# Patient Record
Sex: MALE | Race: OTHER | HISPANIC OR LATINO
[De-identification: names, ages, dates, MRNs, and addresses within clinical notes are randomized per-mention and may not be internally consistent; named-entity substitution may affect disease eponyms.]

---

## 2018-03-02 PROBLEM — Z00.00 ENCOUNTER FOR PREVENTIVE HEALTH EXAMINATION: Status: ACTIVE | Noted: 2018-03-02

## 2018-03-09 ENCOUNTER — APPOINTMENT (OUTPATIENT)
Dept: INTERNAL MEDICINE | Facility: CLINIC | Age: 38
End: 2018-03-09

## 2023-07-23 ENCOUNTER — EMERGENCY (EMERGENCY)
Facility: HOSPITAL | Age: 43
LOS: 0 days | Discharge: HOME | End: 2023-07-23
Attending: EMERGENCY MEDICINE
Payer: COMMERCIAL

## 2023-07-23 VITALS
WEIGHT: 214.95 LBS | OXYGEN SATURATION: 96 % | SYSTOLIC BLOOD PRESSURE: 159 MMHG | HEART RATE: 94 BPM | HEIGHT: 67 IN | DIASTOLIC BLOOD PRESSURE: 89 MMHG

## 2023-07-23 DIAGNOSIS — F17.200 NICOTINE DEPENDENCE, UNSPECIFIED, UNCOMPLICATED: ICD-10-CM
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
## 2023-07-23 DIAGNOSIS — R06.7 SNEEZING: ICD-10-CM

## 2023-07-23 DIAGNOSIS — R07.89 OTHER CHEST PAIN: ICD-10-CM

## 2023-07-23 DIAGNOSIS — R05.9 COUGH, UNSPECIFIED: ICD-10-CM
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
## 2023-07-23 PROCEDURE — 99284 EMERGENCY DEPT VISIT MOD MDM: CPT

## 2023-07-23 PROCEDURE — 93010 ELECTROCARDIOGRAM REPORT: CPT

## 2023-07-23 PROCEDURE — 96372 THER/PROPH/DIAG INJ SC/IM: CPT

## 2023-07-23 PROCEDURE — 71046 X-RAY EXAM CHEST 2 VIEWS: CPT

## 2023-07-23 PROCEDURE — 99283 EMERGENCY DEPT VISIT LOW MDM: CPT | Mod: 25

## 2023-07-23 PROCEDURE — 93005 ELECTROCARDIOGRAM TRACING: CPT

## 2023-07-23 PROCEDURE — 71046 X-RAY EXAM CHEST 2 VIEWS: CPT | Mod: 26

## 2023-07-23 RX ORDER — KETOROLAC TROMETHAMINE 30 MG/ML
30 SYRINGE (ML) INJECTION ONCE
Refills: 0 | Status: DISCONTINUED | OUTPATIENT
Start: 2023-07-23 | End: 2023-07-23

## 2023-07-23 RX ADMIN — Medication 30 MILLIGRAM(S): at 21:33

## 2023-07-23 NOTE — ED PROVIDER NOTE - PATIENT PORTAL LINK FT
You can access the FollowMyHealth Patient Portal offered by [facility] by registering at the following website: [email or web address]. By joining [employer]’s FollowMyHealth portal, you will also be able to view your health information using other applications (apps) compatible with our system.

## 2023-07-23 NOTE — ED PROVIDER NOTE - PHYSICAL EXAMINATION
Physical Exam    Vital Signs: I have reviewed the initial vital signs.  Constitutional: appears stated age, no acute distress  Eyes: Sclera clear, EOMI.  Cardiovascular: S1 and S2, regular rate, regular rhythm, well-perfused extremities, radial pulses equal and 2+  Respiratory: unlabored respiratory effort, clear to auscultation bilaterally no wheezing, rales, or rhonchi  Gastrointestinal:  abdomen soft, non-tender, no pulsatile mass, normal bowl sounds  Musculoskeletal: supple neck, left-sided parasternal anterior chest wall tenderness, no crepitus to thorax  Integumentary: warm, dry, no rash, no ecchymosis  Neurologic: awake, alert, oriented x3

## 2023-07-23 NOTE — ED PROVIDER NOTE - NSFOLLOWUPINSTRUCTIONS_ED_ALL_ED_FT
Follow-up with your PCP in 1-3 days.    Chest Pain    Chest pain can be caused by many different conditions which may or may not be dangerous. Causes include heartburn, lung infections, heart attack, blood clot in lungs, skin infections, strain or damage to muscle, cartilage, or bones, etc. In addition to a history and physical examination, an electrocardiogram (ECG) or other lab tests may have been performed to determine the cause of your chest pain. Follow up with your primary care provider or with a cardiologist as instructed.     SEEK IMMEDIATE MEDICAL CARE IF YOU HAVE ANY OF THE FOLLOWING SYMPTOMS: worsening chest pain, coughing up blood, unexplained back/neck/jaw pain, severe abdominal pain, dizziness or lightheadedness, fainting, shortness of breath, sweaty or clammy skin, vomiting, or racing heart beat. These symptoms may represent a serious problem that is an emergency. Do not wait to see if the symptoms will go away. Get medical help right away. Call 911 and do not drive yourself to the hospital.

## 2023-07-23 NOTE — ED PROVIDER NOTE - CLINICAL SUMMARY MEDICAL DECISION MAKING FREE TEXT BOX
No distress.  EKG non ischemic.  BS equal B/L.  CXR grossly normal without PTX or obvious rib fracture.  Patient with tenderness to AP compression.  Likely non displaced rib fx.  Supportive care. DC home.  Strict return instructions discussed.

## 2023-07-23 NOTE — ED PROVIDER NOTE - OBJECTIVE STATEMENT
42-year-old male denies past medical history presents with complaint of chest wall pain.  Patient reports on Sunday evening he was skateboarding on a hill and fell onto his left side.  Denies head trauma, LOC, nausea/vomiting, difficulty ambulating afterwards.  Reports subsequent to the fall he has had left sided anterior parasternal chest wall tenderness, exacerbated with coughing, sneezing, bearing down, laughing, blowing his nose.  Reports the pain is positional (worse with leaning forward), nonradiating, nonpleuritic.  Denies fever/chills, persistent cough, shortness of breath, abdominal pain, nausea/vomiting/diarrhea, numbness/tingling.

## 2023-07-23 NOTE — ED ADULT TRIAGE NOTE - CHIEF COMPLAINT QUOTE
"I have sharp chest pain when I cough, sneeze or make any sudden movements after I fell down a hill off my skateboard 2 days ago" pt also presents with abrasions to LLE

## 2023-07-26 ENCOUNTER — EMERGENCY (EMERGENCY)
Facility: HOSPITAL | Age: 43
LOS: 0 days | Discharge: HOME | End: 2023-07-27
Attending: EMERGENCY MEDICINE
Payer: COMMERCIAL

## 2023-07-26 VITALS
SYSTOLIC BLOOD PRESSURE: 135 MMHG | HEART RATE: 97 BPM | OXYGEN SATURATION: 96 % | TEMPERATURE: 96 F | DIASTOLIC BLOOD PRESSURE: 83 MMHG | RESPIRATION RATE: 18 BRPM

## 2023-07-26 VITALS
SYSTOLIC BLOOD PRESSURE: 145 MMHG | DIASTOLIC BLOOD PRESSURE: 78 MMHG | WEIGHT: 214.95 LBS | RESPIRATION RATE: 18 BRPM | TEMPERATURE: 99 F | HEIGHT: 67 IN | HEART RATE: 78 BPM | OXYGEN SATURATION: 98 %

## 2023-07-26 DIAGNOSIS — R07.81 PLEURODYNIA: ICD-10-CM
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
## 2023-07-26 DIAGNOSIS — M54.50 LOW BACK PAIN, UNSPECIFIED: ICD-10-CM
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
## 2023-07-26 DIAGNOSIS — S29.8XXA OTHER SPECIFIED INJURIES OF THORAX, INITIAL ENCOUNTER: ICD-10-CM

## 2023-07-26 PROCEDURE — 71250 CT THORAX DX C-: CPT | Mod: MA

## 2023-07-26 PROCEDURE — 99284 EMERGENCY DEPT VISIT MOD MDM: CPT | Mod: 25

## 2023-07-26 PROCEDURE — 99284 EMERGENCY DEPT VISIT MOD MDM: CPT

## 2023-07-26 NOTE — ED ADULT TRIAGE NOTE - CHIEF COMPLAINT QUOTE
patient was told that he have a Fx rib lastweek after the x ray  ,now requesting a CT scan   . c/o worsening pain

## 2023-07-27 PROBLEM — Z78.9 OTHER SPECIFIED HEALTH STATUS: Chronic | Status: ACTIVE | Noted: 2023-07-23

## 2023-07-27 PROCEDURE — 71250 CT THORAX DX C-: CPT | Mod: 26,MA

## 2023-07-27 NOTE — ED PROVIDER NOTE - NSFOLLOWUPINSTRUCTIONS_ED_ALL_ED_FT
Follow up with your PMD. Return to the ED with progressing symptoms.    Chest Contusion, Adult  A chest contusion is a deep bruise on the chest. Bruises happen when an injury causes bleeding under the skin. Minor bruises may cause no pain, but serious ones may stay painful and swollen for a few weeks.    What are the causes?  Motor vehicle crashes.  Falls.  Bicycle injuries.  Injuries from playing contact sports.  What increases the risk?  Playing sports in which contact and falls are common.  What are the signs or symptoms?  Swelling in the chest.  Pain and tenderness in your chest.  Pain when you move your chest or your shoulders.  Redness in the chest. The area may also turn blue, purple, or yellow.  Pain when you take deep breaths.  How is this treated?  Rest.  Put ice to the injured area.  Do deep-breathing exercises. This reduces the risk of pneumonia.  Take over-the-counter medicines to treat pain.  You may be given oxygen if:  You have trouble breathing.  You have low oxygen levels.  Follow these instructions at home:  Bag of ice on a towel on the skin.  Managing pain, stiffness, and swelling    If you are told, put ice on the injured area. To do this:  Put ice in a plastic bag.  Place a towel between your skin and the bag.  Leave the ice on for 20 minutes, 2–3 times per day.  Take off the ice if your skin turns bright red. This is very important. If you cannot feel pain, heat, or cold, you have a greater risk of damage to the area.  Take over-the-counter and prescription medicines only as told by your doctor.  General instructions    Do not lift anything that is heavier than 10 lb (4.5 kg), or the limit that you are told.  Rest as told by your doctor.  Get up to take short walks every 1–2 hours. This is important to improve blood flow and breathing.  Ask for help if you feel weak or unsteady.  Do exercises to help with breathing, if your doctor tells you to do so.  Do not smoke or use any products that contain nicotine or tobacco. If you need help quitting, ask your doctor.  Keep all follow-up visits. This is important.  Contact a doctor if:  Treatment or medicines do not help your swelling or pain.  You have more bruising.  You have more swelling.  Your pain gets worse.  Your symptoms do not get better in one week.  Get help right away if:  You suddenly have a lot more pain.  You have trouble breathing.  You feel dizzy or weak.  You faint.  You have blood in your pee (urine) or poop (stool).  You cough up blood or you vomit blood.  These symptoms may be an emergency. Get help right away. Call your local emergency services (911 in the [place]  Do not wait to see if the symptoms will go away.  Do not drive yourself to the hospital.  Summary  A chest contusion is a deep bruise on the chest. Bruises happen when an injury causes bleeding under the skin.  Treatment may include resting and putting ice on the injured area.  Contact a doctor if treatment or medicines do not help, or if you have more pain or swelling.  Get help right away if you suddenly have a lot of pain, or have trouble breathing, or feel weak or dizzy.  Get help right away if you cough up blood, or if you see blood in your urine, poop, or vomit.  This information is not intended to replace advice given to you by your health care provider. Make sure you discuss any questions you have with your health care provider.

## 2023-07-27 NOTE — ED PROVIDER NOTE - CLINICAL SUMMARY MEDICAL DECISION MAKING FREE TEXT BOX
Patient presented for evaluation of rib pain after a fall 3 days ago.  Had CT chest done without any acute findings.  Will discharge with outpatient follow-up.

## 2023-07-27 NOTE — ED PROVIDER NOTE - PHYSICAL EXAMINATION
CONSTITUTIONAL: Well-developed; well-nourished; in no acute distress, nontoxic appearing  SKIN: skin exam is warm and dry,  HEAD: Normocephalic; atraumatic.  EYES: PERRL, 3 mm bilateral, no nystagmus, EOM intact; conjunctiva and sclera clear.  ENT: MMM, no nasal congestion  NECK: Supple; non tender.+ full passive ROM in all directions. No JVD  CARD: S1, S2 normal, no murmur  RESP: No wheezes, rales or rhonchi. Good air movement bilaterally  CHESTWALL: + ttp of the left ant lower ribs, mid clavicular line. No noted deformity, no skin ecchymosis or hematoma  ABD: soft; non-distended; non-tender. No Rebound, No guarding  EXT: Normal ROM. No cyanosis or edema. Dp Pulses intact.   NEURO: awake, alert, following commands, oriented, grossly unremarkable. No Focal deficits. GCS 15.   PSYCH: Cooperative, appropriate.

## 2023-07-27 NOTE — ED PROVIDER NOTE - OBJECTIVE STATEMENT
43-year-old male presents 3 days after being in the ED for evaluation of left-sided rib pain.  Patient reports falling off of a skateboard sustaining left anterior lower back pain.  Had chest x-ray done which did not show any acute findings however he is presenting due to persistent pain.  Denies any shortness of breath, fever, coughing, hemoptysis, lightheadedness, LOC.

## 2023-07-27 NOTE — ED PROVIDER NOTE - CARE PROVIDER_API CALL
See, Publius  Pulmonary Disease  [address] [place] [address]  Phone: [phone]  Fax: [phone]  Follow Up Time: 7-10 Days

## 2023-07-27 NOTE — ED PROVIDER NOTE - PATIENT PORTAL LINK FT
You can access the FollowMyHealth Patient Portal offered by [facility] by registering at the following website: [email or web address]. By joining [employer]’s FollowMyHealth portal, you will also be able to view your health information using other applications (apps) compatible with our system.

## 2023-09-29 ENCOUNTER — NON-APPOINTMENT (OUTPATIENT)
Age: 43
End: 2023-09-29